# Patient Record
Sex: MALE | Race: WHITE | Employment: FULL TIME | URBAN - NONMETROPOLITAN AREA
[De-identification: names, ages, dates, MRNs, and addresses within clinical notes are randomized per-mention and may not be internally consistent; named-entity substitution may affect disease eponyms.]

---
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
## 2020-10-19 ENCOUNTER — HOSPITAL ENCOUNTER (EMERGENCY)
Age: 20
Discharge: HOME | End: 2020-10-19

## 2020-10-19 VITALS
RESPIRATION RATE: 16 BRPM | SYSTOLIC BLOOD PRESSURE: 137 MMHG | TEMPERATURE: 98.3 F | BODY MASS INDEX: 26.6 KG/M2 | DIASTOLIC BLOOD PRESSURE: 64 MMHG | WEIGHT: 190 LBS | HEART RATE: 81 BPM | HEIGHT: 71 IN | OXYGEN SATURATION: 98 %

## 2020-10-19 PROCEDURE — 99213 OFFICE O/P EST LOW 20 MIN: CPT

## 2020-10-19 PROCEDURE — 99203 OFFICE O/P NEW LOW 30 MIN: CPT | Performed by: NURSE PRACTITIONER

## 2020-10-19 RX ORDER — ACETAMINOPHEN 325 MG/1
650 TABLET ORAL EVERY 6 HOURS PRN
COMMUNITY

## 2020-10-19 RX ORDER — DICYCLOMINE HCL 20 MG
20 TABLET ORAL EVERY 6 HOURS PRN
Qty: 56 TABLET | Refills: 0 | Status: SHIPPED | OUTPATIENT
Start: 2020-10-19 | End: 2021-04-24

## 2020-10-19 ASSESSMENT — ENCOUNTER SYMPTOMS
VOMITING: 0
ABDOMINAL PAIN: 1
BLOOD IN STOOL: 0
NAUSEA: 0
DIARRHEA: 1

## 2020-10-19 ASSESSMENT — PAIN SCALES - GENERAL: PAINLEVEL_OUTOF10: 3

## 2020-10-19 ASSESSMENT — PAIN DESCRIPTION - PROGRESSION: CLINICAL_PROGRESSION: GRADUALLY WORSENING

## 2020-10-19 ASSESSMENT — PAIN DESCRIPTION - ORIENTATION: ORIENTATION: LEFT;LOWER

## 2020-10-19 ASSESSMENT — PAIN DESCRIPTION - LOCATION: LOCATION: ABDOMEN

## 2020-10-19 ASSESSMENT — PAIN DESCRIPTION - ONSET: ONSET: ON-GOING

## 2020-10-19 ASSESSMENT — PAIN DESCRIPTION - PAIN TYPE: TYPE: ACUTE PAIN

## 2020-10-19 ASSESSMENT — PAIN DESCRIPTION - DESCRIPTORS: DESCRIPTORS: CRAMPING

## 2020-10-19 ASSESSMENT — PAIN DESCRIPTION - FREQUENCY: FREQUENCY: INTERMITTENT

## 2020-10-19 NOTE — ED PROVIDER NOTES
Patient  reports that he is a non-smoker but has been exposed to tobacco smoke. He has never used smokeless tobacco. He reports current drug use. Drug: Marijuana. He reports that he does not drink alcohol. PHYSICAL EXAM     ED TRIAGE VITALS  BP: 137/64, Temp: 98.3 °F (36.8 °C), Pulse: 81, Resp: 16, SpO2: 98 %,Estimated body mass index is 26.5 kg/m² as calculated from the following:    Height as of this encounter: 5' 11\" (1.803 m). Weight as of this encounter: 190 lb (86.2 kg). ,No LMP for male patient. Physical Exam  Constitutional:       General: He is not in acute distress. Appearance: He is well-developed. He is not ill-appearing, toxic-appearing or diaphoretic. Abdominal:      Tenderness: There is abdominal tenderness (left lower) in the left lower quadrant. Skin:     General: Skin is warm. Neurological:      General: No focal deficit present. Mental Status: He is alert and oriented to person, place, and time. Cranial Nerves: No cranial nerve deficit. Motor: No weakness. Psychiatric:         Mood and Affect: Mood normal. Mood is not anxious or depressed. Behavior: Behavior normal.         DIAGNOSTIC RESULTS     Labs:No results found for this visit on 10/19/20. IMAGING:    No orders to display     URGENT CARE COURSE:     Vitals:    10/19/20 1558   BP: 137/64   Pulse: 81   Resp: 16   Temp: 98.3 °F (36.8 °C)   SpO2: 98%   Weight: 190 lb (86.2 kg)   Height: 5' 11\" (1.803 m)       Medications - No data to display         PROCEDURES:  None    FINAL IMPRESSION      1. Diarrhea, unspecified type    2. Gastroenteritis          DISPOSITION/ PLAN   Patient is discharged home with prescription for Bentyl for management of ongoing diarrhea.   Commend clear liquid diet today, as tolerated tomorrow patient declines need for GI panel with PCR with loose stool, he is open to following up with GI specialist given his symptoms seem to be ongoing, has had similar episodes in the past.

## 2020-10-19 NOTE — ED TRIAGE NOTES
To room with c/o diarrhea for 2 weeks. LLQ abdominal pain that has progressed from 3/10 at 5/10. No vomiting. Bloating after eating.

## 2020-10-19 NOTE — ED NOTES
Pt. Released in stable condition, ambulated per self to private car. Instructed pt to follow-up with family doctor as needed for recheck or go directly to the emergency department for any concerns/worsening conditions. Pt. Verbalized understanding of instructions. No questions at this time. RX in hand.       Fernando Lew RN  10/19/20 9687

## 2021-04-24 ENCOUNTER — HOSPITAL ENCOUNTER (EMERGENCY)
Age: 21
Discharge: HOME | End: 2021-04-24

## 2021-04-24 VITALS
DIASTOLIC BLOOD PRESSURE: 88 MMHG | SYSTOLIC BLOOD PRESSURE: 158 MMHG | RESPIRATION RATE: 18 BRPM | TEMPERATURE: 98.2 F | WEIGHT: 192 LBS | BODY MASS INDEX: 26.88 KG/M2 | HEIGHT: 71 IN | HEART RATE: 94 BPM | OXYGEN SATURATION: 98 %

## 2021-04-24 DIAGNOSIS — J01.20 ACUTE NON-RECURRENT ETHMOIDAL SINUSITIS: Primary | ICD-10-CM

## 2021-04-24 PROCEDURE — 99213 OFFICE O/P EST LOW 20 MIN: CPT | Performed by: NURSE PRACTITIONER

## 2021-04-24 PROCEDURE — 99213 OFFICE O/P EST LOW 20 MIN: CPT

## 2021-04-24 RX ORDER — AZITHROMYCIN 250 MG/1
TABLET, FILM COATED ORAL
Qty: 6 TABLET | Refills: 0 | Status: SHIPPED | OUTPATIENT
Start: 2021-04-24

## 2021-04-24 RX ORDER — PREDNISONE 20 MG/1
20 TABLET ORAL DAILY
Qty: 7 TABLET | Refills: 0 | Status: SHIPPED | OUTPATIENT
Start: 2021-04-24 | End: 2021-05-01

## 2021-04-24 RX ORDER — DIPHENHYDRAMINE HCL 25 MG
25 TABLET ORAL EVERY 6 HOURS PRN
COMMUNITY

## 2021-04-24 ASSESSMENT — ENCOUNTER SYMPTOMS
RHINORRHEA: 1
DIARRHEA: 0
SINUS PRESSURE: 1
SINUS PAIN: 0
NAUSEA: 0
VOMITING: 0
SHORTNESS OF BREATH: 0
TROUBLE SWALLOWING: 0
SORE THROAT: 0
COUGH: 0
BACK PAIN: 0

## 2021-04-24 NOTE — ED PROVIDER NOTES
[address]  Urgent Care Encounter       CHIEF COMPLAINT       Chief Complaint   Patient presents with    Sinusitis       Nurses Notes reviewed and I agree except as noted in the HPI. HISTORY OF PRESENT ILLNESS   Musa Sanders is a 23 y.o. male who presents the urgent care center complaining of sinus pressure thick green nasal drainage postnasally. Over the past week. Patient states she is has not taken really any over-the-counter medication for this. He denies any vertigo or tinnitus. Patient denies any ear pain sore throat cough patient states that he has had intermittent bouts of nausea but thinks is from postnasal drainage. Present time patient is sitting in the chair skin is warm dry patient does not appear to be in any acute distress. The history is provided by the patient. No  was used. Sinusitis  Pain details:     Location:  Frontal and maxillary    Severity:  No pain    Duration:  1 week    Timing:  Constant  Duration:  1 week  Progression:  Worsening  Chronicity:  New  Context: not allergies    Relieved by:  None tried  Worsened by:  Nothing  Ineffective treatments:  None tried  Associated symptoms: congestion and rhinorrhea    Associated symptoms: no chest pain, no chills, no cough, no ear pain, no fatigue, no fever, no headaches, no nausea, no shortness of breath, no sore throat and no vomiting    Congestion:     Interferes with sleep: no      Interferes with eating/drinking: no    Rhinorrhea:     Quality:  Green    Severity:  Mild    Duration:  1 week    Timing:  Constant    Progression:  Unchanged      REVIEW OF SYSTEMS     Review of Systems   Constitutional: Negative for activity change, appetite change, chills, fatigue and fever. HENT: Positive for congestion, rhinorrhea and sinus pressure. Negative for ear pain, sinus pain, sore throat and trouble swallowing. Respiratory: Negative for cough and shortness of breath.     Cardiovascular: encounter: 192 lb (87.1 kg). ,No LMP for male patient. Physical Exam  Vitals signs and nursing note reviewed. Constitutional:       General: He is not in acute distress. Appearance: He is well-developed. He is not ill-appearing, toxic-appearing or diaphoretic. HENT:      Head: Normocephalic. Right Ear: Hearing, tympanic membrane, ear canal and external ear normal. Tympanic membrane is not erythematous. Left Ear: Hearing, tympanic membrane, ear canal and external ear normal. Tympanic membrane is not erythematous. Nose: Mucosal edema and congestion present. No rhinorrhea. Right Turbinates: Not enlarged or swollen. Left Turbinates: Not enlarged or swollen. Comments: Ethmoid sinus pain/pressure     Mouth/Throat:      Lips: Pink. Mouth: Mucous membranes are moist.      Tongue: No lesions. Pharynx: Oropharynx is clear. Uvula midline. No pharyngeal swelling, oropharyngeal exudate, posterior oropharyngeal erythema or uvula swelling. Tonsils: No tonsillar exudate or tonsillar abscesses. Eyes:      Conjunctiva/sclera: Conjunctivae normal.      Pupils: Pupils are equal, round, and reactive to light. Neck:      Musculoskeletal: Full passive range of motion without pain, normal range of motion and neck supple. No neck rigidity. Cardiovascular:      Rate and Rhythm: Normal rate and regular rhythm. Heart sounds: Normal heart sounds, S1 normal and S2 normal.   Pulmonary:      Effort: Pulmonary effort is normal. No accessory muscle usage. Breath sounds: Normal breath sounds. No decreased air movement. No decreased breath sounds, wheezing, rhonchi or rales. Lymphadenopathy:      Head:      Right side of head: No submental, submandibular, tonsillar, preauricular, posterior auricular or occipital adenopathy. Left side of head: No submental, submandibular, tonsillar, preauricular, posterior auricular or occipital adenopathy.       Cervical: No cervical adenopathy. Right cervical: No superficial, deep or posterior cervical adenopathy. Left cervical: No superficial, deep or posterior cervical adenopathy. Skin:     General: Skin is warm and dry. Capillary Refill: Capillary refill takes less than 2 seconds. Neurological:      General: No focal deficit present. Mental Status: He is alert and oriented to person, place, and time. Psychiatric:         Mood and Affect: Mood normal.         Speech: Speech normal.         Behavior: Behavior normal. Behavior is cooperative. DIAGNOSTIC RESULTS     Labs:No results found for this visit on 04/24/21. IMAGING:    No orders to display         EKG:      URGENT CARE COURSE:     Vitals:    04/24/21 1641   BP: (!) 158/88   Pulse: 94   Resp: 18   Temp: 98.2 °F (36.8 °C)   TempSrc: Temporal   SpO2: 98%   Weight: 192 lb (87.1 kg)   Height: 5' 11\" (1.803 m)       Medications - No data to display         PROCEDURES:  None    FINAL IMPRESSION      1. Acute non-recurrent ethmoidal sinusitis          DISPOSITION/ PLAN        The patient/Patient representative was advised to rest, drink lots of fluids, take Motrin and Tylenol for any fever, chills generalized body aches. The patient was also advised to monitor urine output for signs of dehydration. If the patient develops any chest pain, shortness of breath, neck pain or stiffness or abdominal pain or any other concerns, the patient is to call 911 or go to the emergency department for further evaluation. If the patient does not experience any of the above symptoms he is to follow-up with his primary care provider in 2-3 days for reevaluation. The patient/Patient representative are agreeable to the treatment plan at this time. The patient left the urgent care center in stable condition. PATIENT REFERRED TO:  Buzz Mercedes, APRN - CNP  [address] [facility] [address]      DISCHARGE MEDICATIONS:  Discharge Medication List as of 4/24/2021  5:15 PM      START taking these medications    Details   azithromycin (ZITHROMAX Z-EVIN) 250 MG tablet 2 tablets day 1 then1 tablet days 2 - 5., Disp-6 tablet, R-0Normal      predniSONE (DELTASONE) 20 MG tablet Take 1 tablet by mouth daily for 7 days, Disp-7 tablet, R-0Normal             Discharge Medication List as of 4/24/2021  5:15 PM      STOP taking these medications       dicyclomine (BENTYL) 20 MG tablet Comments:   Reason for Stopping:               Discharge Medication List as of 4/24/2021  5:15 PM          TJ Najera CNP    (Please note that portions of this note were completed with a voice recognition program. Efforts were made to edit the dictations but occasionally words are mis-transcribed.)           TJ Najera CNP  04/24/21 1014

## 2021-04-24 NOTE — ED NOTES
PT GIVEN DISCHARGE INSTRUCTIONS, VERBALIZES UNDERSTANDING. PT ASSESSMENT UNCHANGED, DISCHARGED IN STABLE CONDITION.         Allison Lakhnai RN  04/24/21 5429

## 2023-01-01 ENCOUNTER — HOSPITAL ENCOUNTER (EMERGENCY)
Age: 23
Discharge: HOME | End: 2023-01-01
Attending: EMERGENCY MEDICINE
Payer: COMMERCIAL

## 2023-01-01 ENCOUNTER — APPOINTMENT (OUTPATIENT)
Dept: GENERAL RADIOLOGY | Age: 23
End: 2023-01-01
Payer: COMMERCIAL

## 2023-01-01 VITALS
WEIGHT: 151 LBS | HEART RATE: 71 BPM | TEMPERATURE: 98.4 F | DIASTOLIC BLOOD PRESSURE: 67 MMHG | HEIGHT: 71 IN | RESPIRATION RATE: 16 BRPM | OXYGEN SATURATION: 99 % | BODY MASS INDEX: 21.14 KG/M2 | SYSTOLIC BLOOD PRESSURE: 119 MMHG

## 2023-01-01 DIAGNOSIS — J10.1 INFLUENZA A: Primary | ICD-10-CM

## 2023-01-01 LAB
FLU A ANTIGEN: POSITIVE
FLU B ANTIGEN: NEGATIVE
GROUP A STREP CULTURE, REFLEX: NEGATIVE
REFLEX THROAT C + S: NORMAL
SARS-COV-2, NAAT: NOT  DETECTED

## 2023-01-01 PROCEDURE — 87880 STREP A ASSAY W/OPTIC: CPT

## 2023-01-01 PROCEDURE — 71046 X-RAY EXAM CHEST 2 VIEWS: CPT

## 2023-01-01 PROCEDURE — 87070 CULTURE OTHR SPECIMN AEROBIC: CPT

## 2023-01-01 PROCEDURE — 87635 SARS-COV-2 COVID-19 AMP PRB: CPT

## 2023-01-01 PROCEDURE — 87804 INFLUENZA ASSAY W/OPTIC: CPT

## 2023-01-01 PROCEDURE — 99284 EMERGENCY DEPT VISIT MOD MDM: CPT | Performed by: EMERGENCY MEDICINE

## 2023-01-01 NOTE — DISCHARGE INSTRUCTIONS
Over the counter medication for pain, fever as needed. Over the counter cough medication as needed. Rest, plenty of fluids to drink. Return to ED for increasing shortness of breath, or any other signs of worsening.

## 2023-01-01 NOTE — ED NOTES
Pt presents ambulatory to ED nasal congestion, fatigue, cough, chills. Symptoms initially started approx. 4 days ago. Throat, flu and covid swabs obtained and taken to lab.       Elpidio Callahan RN  01/01/23 8773

## 2023-01-01 NOTE — Clinical Note
Estela Ortiz was seen and treated in our emergency department on 1/1/2023. He may return to work on 01/02/2023. Test positive for influenza A today. If you have any questions or concerns, please don't hesitate to call.       Batsheva Arguello MD

## 2023-01-02 ASSESSMENT — ENCOUNTER SYMPTOMS
COUGH: 1
SORE THROAT: 1
DIARRHEA: 0
WHEEZING: 1
ABDOMINAL PAIN: 0
NAUSEA: 0
SHORTNESS OF BREATH: 0

## 2023-01-02 NOTE — ED NOTES
AVS and work note rev'd with pt. And copy given. Pulse regular. Extremities warm. Respirations regular and quiet. Mucous membranes pink & moist. Alert and oriented times 3. No nausea or vomiting. Range of motion within patient's limits. Skin pink, warm and dry. Calm and cooperative.       Brynn Heck RN  01/01/23 4461

## 2023-01-02 NOTE — ED PROVIDER NOTES
[facility]  eMERGENCY dEPARTMENT eNCOUnter             [address] COMPLAINT    Chief Complaint   Patient presents with    Cough    Fatigue    Pharyngitis    Chills       Nurses Notes reviewed and I agree except as noted in the HPI. HPI    Tierney Jang is a 20 y.o. male who presents with a 4-day history of nasal congestion, fatigue, cough, chills, fever, extreme weakness, generalized malaise. He has been going to work, because he cannot afford not to. While he was at work today, he had to leave early because of his chills and extreme weakness. He works at [employer]. Current pain level is 4/10, aching, constant. REVIEW OF SYSTEMS      Review of Systems   Constitutional:  Positive for chills, fever and malaise/fatigue. HENT:  Positive for congestion and sore throat. Respiratory:  Positive for cough and wheezing. Negative for shortness of breath. Cardiovascular:  Negative for chest pain. Gastrointestinal:  Negative for abdominal pain, diarrhea and nausea. Musculoskeletal:  Positive for myalgias. Skin:  Negative for rash. Neurological:  Positive for dizziness, weakness and headaches. Negative for focal weakness. All other systems reviewed and are negative. PAST MEDICAL HISTORY     has a past medical history of ADHD (attention deficit hyperactivity disorder), Platelet dysfunction ([address] and Seizures ([facility] [address] SURGICAL HISTORY     has a past surgical history that includes Appendectomy. CURRENT MEDICATIONS    Discharge Medication List as of 1/1/2023  6:58 PM        CONTINUE these medications which have NOT CHANGED    Details   acetaminophen (TYLENOL) 325 MG tablet Take 650 mg by mouth every 6 hours as needed for PainHistorical Med             ALLERGIES    is allergic to amoxicillin and ibuprofen. FAMILY HISTORY    He indicated that his mother is alive. He indicated that his father is alive.    family history includes Anxiety Disorder in his mother; High Blood Pressure in his mother; No Known Problems in his father; Thyroid Disease in his mother. SOCIAL HISTORY     reports that he has quit smoking. He has never used smokeless tobacco. He reports current drug use. Drug: Marijuana Christine Eglin). He reports that he does not drink alcohol. PHYSICAL EXAM       INITIAL VITALS: /67   Pulse 71   Temp 98.4 °F (36.9 °C) (Temporal)   Resp 16   Ht 5' 11\" (1.803 m)   Wt 151 lb (68.5 kg)   SpO2 99%   BMI 21.06 kg/m²      Physical Exam  Vitals and nursing note reviewed. Constitutional:       Appearance: He is ill-appearing. HENT:      Right Ear: Tympanic membrane and ear canal normal.      Left Ear: Tympanic membrane and ear canal normal.      Nose: Congestion present. No rhinorrhea. Mouth/Throat:      Mouth: Mucous membranes are moist.      Pharynx: Posterior oropharyngeal erythema present. No oropharyngeal exudate. Eyes:      Conjunctiva/sclera: Conjunctivae normal.      Pupils: Pupils are equal, round, and reactive to light. Cardiovascular:      Rate and Rhythm: Normal rate and regular rhythm. Heart sounds: No murmur heard. Pulmonary:      Effort: Pulmonary effort is normal.      Breath sounds: Wheezing (End expiratory, right greater than left) present. Abdominal:      General: Bowel sounds are normal.      Palpations: Abdomen is soft. There is no mass. Tenderness: There is no abdominal tenderness. Musculoskeletal:      Cervical back: Neck supple. Lymphadenopathy:      Cervical: No cervical adenopathy. Skin:     General: Skin is warm and dry. Neurological:      General: No focal deficit present. Mental Status: He is alert and oriented to person, place, and time. Psychiatric:         Behavior: Behavior normal.      Differential diagnosis: COVID, flu, strep, other viral URI, sinusitis, bronchitis, pneumonia    RADIOLOGY:    XR CHEST (2 VW)   Final Result   Normal chest. No acute findings. **This report has been created using voice recognition software. It may contain minor errors which are inherent in voice recognition technology. **      Final report electronically signed by Dr. Mariam Fernando on 1/1/2023 6:39 PM              LABS:     Labs Reviewed   RAPID INFLUENZA A/B ANTIGENS - Abnormal; Notable for the following components:       Result Value    Flu A Antigen Positive (*)     All other components within normal limits   COVID-19, RAPID   CULTURE, THROAT    Narrative:     Source: throat       Site: swab          Current Antibiotics: none   GROUP A STREP, REFLEX       Vitals:    Vitals:    01/01/23 1738   BP: 119/67   Pulse: 71   Resp: 16   Temp: 98.4 °F (36.9 °C)   TempSrc: Temporal   SpO2: 99%   Weight: 151 lb (68.5 kg)   Height: 5' 11\" (1.803 m)       EMERGENCY DEPARTMENT COURSE:    Patient has influenza A. He does not have COVID-19. He does not have strep. No signs of pneumonia. All of this was discussed with the patient. He has been ill too long for Tamiflu to be effective. General measures for influenza discussed. He is given a note for time missed at work today. He says \"they do not care if I have a note \". He is planning to try to go to work tomorrow. No current evidence of bacterial infection. Symptomatic care only. MIPS for viral respiratory infection: No antibiotic given. FINAL IMPRESSION      1. Influenza A        DISPOSITION/PLAN    DISPOSITION Decision To Discharge 01/01/2023 06:55:10 PM      PATIENT REFERRED TO:    [facility]  [address] [address] [id number]  [phone]      As needed      DISCHARGE MEDICATIONS:    Discharge Medication List as of 1/1/2023  6:58 PM             (Please note that portions of this note were completed with a voice recognition program.  Efforts were made to edit the dictations but occasionally words are mis-transcribed.)       Giacomo Prater MD  01/02/23 0383

## 2023-01-04 LAB — THROAT/NOSE CULTURE: NORMAL

## 2023-05-31 ENCOUNTER — OFFICE VISIT (OUTPATIENT)
Dept: FAMILY MEDICINE CLINIC | Age: 23
End: 2023-05-31
Payer: COMMERCIAL

## 2023-05-31 VITALS
OXYGEN SATURATION: 99 % | HEART RATE: 79 BPM | TEMPERATURE: 97.6 F | WEIGHT: 156.2 LBS | DIASTOLIC BLOOD PRESSURE: 60 MMHG | BODY MASS INDEX: 21.87 KG/M2 | HEIGHT: 71 IN | SYSTOLIC BLOOD PRESSURE: 110 MMHG | RESPIRATION RATE: 14 BRPM

## 2023-05-31 DIAGNOSIS — F90.0 ATTENTION DEFICIT HYPERACTIVITY DISORDER (ADHD), PREDOMINANTLY INATTENTIVE TYPE: Primary | ICD-10-CM

## 2023-05-31 DIAGNOSIS — Z86.69 HX OF SEIZURE DISORDER: ICD-10-CM

## 2023-05-31 PROCEDURE — 99204 OFFICE O/P NEW MOD 45 MIN: CPT | Performed by: NURSE PRACTITIONER

## 2023-05-31 RX ORDER — ATOMOXETINE 40 MG/1
40 CAPSULE ORAL DAILY
Qty: 30 CAPSULE | Refills: 1 | Status: SHIPPED | OUTPATIENT
Start: 2023-05-31

## 2023-05-31 SDOH — ECONOMIC STABILITY: INCOME INSECURITY: HOW HARD IS IT FOR YOU TO PAY FOR THE VERY BASICS LIKE FOOD, HOUSING, MEDICAL CARE, AND HEATING?: NOT HARD AT ALL

## 2023-05-31 SDOH — ECONOMIC STABILITY: FOOD INSECURITY: WITHIN THE PAST 12 MONTHS, THE FOOD YOU BOUGHT JUST DIDN'T LAST AND YOU DIDN'T HAVE MONEY TO GET MORE.: NEVER TRUE

## 2023-05-31 SDOH — ECONOMIC STABILITY: FOOD INSECURITY: WITHIN THE PAST 12 MONTHS, YOU WORRIED THAT YOUR FOOD WOULD RUN OUT BEFORE YOU GOT MONEY TO BUY MORE.: NEVER TRUE

## 2023-05-31 SDOH — ECONOMIC STABILITY: HOUSING INSECURITY
IN THE LAST 12 MONTHS, WAS THERE A TIME WHEN YOU DID NOT HAVE A STEADY PLACE TO SLEEP OR SLEPT IN A SHELTER (INCLUDING NOW)?: NO

## 2023-05-31 ASSESSMENT — PATIENT HEALTH QUESTIONNAIRE - PHQ9
SUM OF ALL RESPONSES TO PHQ QUESTIONS 1-9: 2
SUM OF ALL RESPONSES TO PHQ QUESTIONS 1-9: 2
2. FEELING DOWN, DEPRESSED OR HOPELESS: 1
SUM OF ALL RESPONSES TO PHQ QUESTIONS 1-9: 2
SUM OF ALL RESPONSES TO PHQ QUESTIONS 1-9: 2
1. LITTLE INTEREST OR PLEASURE IN DOING THINGS: 1
SUM OF ALL RESPONSES TO PHQ9 QUESTIONS 1 & 2: 2

## 2023-05-31 NOTE — PROGRESS NOTES
Ron Hunt (:  2000) is a 20 y.o. male,Established patient, here for evaluation of the following chief complaint(s):  New Patient (/Discuss ADHD- good and bad days/Focus issues/ cant do tasks very well, does cause some anxiety and depression )         ASSESSMENT/PLAN:  1. Attention deficit hyperactivity disorder (ADHD), predominantly inattentive type  2. Hx of seizure disorder    At this time we will avoid stimulants and Wellbutrin as it may aggravate his seizure disorder. We will go ahead and start Strattera to see if it helps him with his ADHD. He is to monitor for any seizure activity. We will consider getting him in with psychiatry and counseling if no better. Return in about 4 weeks (around 2023). Subjective   SUBJECTIVE/OBJECTIVE:  HPI    Hx of Platelet function disorder  Seizures when child  Adhd disorder. 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 Problems focusing. Cant pay attention. Skipping around on task. Effecting both work and home life. Was on meds when he was young but was stopped due to seizures. Review of Systems   Constitutional:  Negative for activity change, appetite change, chills, diaphoresis, fatigue, fever and unexpected weight change. HENT:  Negative for congestion, ear pain, postnasal drip, sinus pressure and sore throat. Eyes:  Negative for visual disturbance. Respiratory:  Negative for cough, chest tightness, shortness of breath, wheezing and stridor. Cardiovascular:  Negative for chest pain, palpitations and leg swelling. Gastrointestinal:  Negative for abdominal distention, abdominal pain, constipation, diarrhea, nausea and vomiting. Endocrine: Negative for polydipsia, polyphagia and polyuria. Genitourinary:  Negative for decreased urine volume, difficulty urinating, dysuria, flank pain, frequency, hematuria and urgency.    Musculoskeletal:  Negative for arthralgias, back pain, gait problem, joint swelling,

## 2023-06-03 ASSESSMENT — ENCOUNTER SYMPTOMS
ABDOMINAL DISTENTION: 0
COLOR CHANGE: 0
NAUSEA: 0
DIARRHEA: 0
CHEST TIGHTNESS: 0
SHORTNESS OF BREATH: 0
SINUS PRESSURE: 0
CONSTIPATION: 0
STRIDOR: 0
SORE THROAT: 0
BACK PAIN: 0
WHEEZING: 0
VOMITING: 0
COUGH: 0
ABDOMINAL PAIN: 0

## 2023-06-28 ENCOUNTER — OFFICE VISIT (OUTPATIENT)
Dept: FAMILY MEDICINE CLINIC | Age: 23
End: 2023-06-28
Payer: COMMERCIAL

## 2023-06-28 VITALS
BODY MASS INDEX: 21.98 KG/M2 | WEIGHT: 157 LBS | DIASTOLIC BLOOD PRESSURE: 84 MMHG | OXYGEN SATURATION: 99 % | HEIGHT: 71 IN | SYSTOLIC BLOOD PRESSURE: 118 MMHG | HEART RATE: 70 BPM | RESPIRATION RATE: 18 BRPM

## 2023-06-28 DIAGNOSIS — Z86.69 HX OF SEIZURE DISORDER: ICD-10-CM

## 2023-06-28 DIAGNOSIS — F90.0 ATTENTION DEFICIT HYPERACTIVITY DISORDER (ADHD), PREDOMINANTLY INATTENTIVE TYPE: Primary | ICD-10-CM

## 2023-06-28 DIAGNOSIS — R61 SWEATING INCREASE: ICD-10-CM

## 2023-06-28 PROCEDURE — 99214 OFFICE O/P EST MOD 30 MIN: CPT | Performed by: NURSE PRACTITIONER

## 2023-06-28 RX ORDER — ATOMOXETINE 60 MG/1
60 CAPSULE ORAL DAILY
Qty: 30 CAPSULE | Refills: 1 | Status: SHIPPED | OUTPATIENT
Start: 2023-06-28

## 2023-06-28 ASSESSMENT — ENCOUNTER SYMPTOMS
SHORTNESS OF BREATH: 0
CHEST TIGHTNESS: 0
COUGH: 0
SORE THROAT: 0
ABDOMINAL PAIN: 0
STRIDOR: 0
ABDOMINAL DISTENTION: 0
SINUS PRESSURE: 0
DIARRHEA: 0
NAUSEA: 0
WHEEZING: 0
COLOR CHANGE: 0
CONSTIPATION: 0
VOMITING: 0
BACK PAIN: 0

## 2024-12-16 ENCOUNTER — HOSPITAL ENCOUNTER (EMERGENCY)
Age: 24
Discharge: HOME | End: 2024-12-16
Attending: EMERGENCY MEDICINE
Payer: COMMERCIAL

## 2024-12-16 VITALS
DIASTOLIC BLOOD PRESSURE: 74 MMHG | BODY MASS INDEX: 23.38 KG/M2 | WEIGHT: 167 LBS | OXYGEN SATURATION: 100 % | RESPIRATION RATE: 20 BRPM | SYSTOLIC BLOOD PRESSURE: 128 MMHG | TEMPERATURE: 97.7 F | HEIGHT: 71 IN | HEART RATE: 69 BPM

## 2024-12-16 DIAGNOSIS — K08.89 PAIN, DENTAL: Primary | ICD-10-CM

## 2024-12-16 PROCEDURE — 99283 EMERGENCY DEPT VISIT LOW MDM: CPT

## 2024-12-16 RX ORDER — CLINDAMYCIN HYDROCHLORIDE 300 MG/1
300 CAPSULE ORAL 3 TIMES DAILY
Qty: 21 CAPSULE | Refills: 0 | Status: SHIPPED | OUTPATIENT
Start: 2024-12-16 | End: 2024-12-23

## 2024-12-16 ASSESSMENT — PAIN SCALES - GENERAL: PAINLEVEL_OUTOF10: 6

## 2024-12-16 ASSESSMENT — PAIN DESCRIPTION - ORIENTATION: ORIENTATION: LEFT;UPPER

## 2024-12-16 ASSESSMENT — PAIN DESCRIPTION - DESCRIPTORS: DESCRIPTORS: THROBBING

## 2024-12-16 ASSESSMENT — PAIN - FUNCTIONAL ASSESSMENT: PAIN_FUNCTIONAL_ASSESSMENT: 0-10

## 2024-12-16 ASSESSMENT — PAIN DESCRIPTION - PAIN TYPE: TYPE: ACUTE PAIN

## 2024-12-16 ASSESSMENT — PAIN DESCRIPTION - LOCATION: LOCATION: TEETH

## 2024-12-16 NOTE — DISCHARGE INSTR - COC
Continuity of Care Form    Patient Name: Ryan C Hosler   :  2000  MRN:  [id number]    Admit date:  2024  Discharge date:  ***    Code Status Order: No Order   Advance Directives:   Advance Care Flowsheet Documentation             Admitting Physician:  No admitting provider for patient encounter.  PCP: Larry Campa, APRN - CNP    Discharging Nurse: ***  Discharging Hospital Unit/Room#: E3/E3  Discharging Unit Phone Number: ***    Emergency Contact:   Extended Emergency Contact Information  Primary Emergency Contact: Hosler,Kelly  Address: [address]           [place] [address] [facility]  Home Phone: [phone]  Relation: Parent  Secondary Emergency Contact: Hosler,Jimmy  Address: [address] [place] [address]            [place] [address] [facility]  Home Phone: [phone]  Relation: Parent    Past Surgical History:  Past Surgical History:   Procedure Laterality Date    APPENDECTOMY         Immunization History:   Immunization History   Administered Date(s) Administered    TDaP, ADACEL (age 10y-64y), BOOSTRIX (age 10y+), IM, 0.5mL 2015       Active Problems:  There is no problem list on file for this patient.      Isolation/Infection:   Isolation            No Isolation          Patient Infection Status       Infection Onset Added Last Indicated Last Indicated By Review Planned Expiration Resolved Resolved By    None active    Resolved    Influenza 23 Rapid influenza A/B antigens   23 Infection                        Nurse Assessment:  Last Vital Signs: /74   Pulse 69   Temp 97.7 °F (36.5 °C) (Temporal)   Resp 20   Ht 1.803 m (5' 11\")   Wt 75.8 kg (167 lb)   SpO2 100%   BMI 23.29 kg/m²     Last documented pain score (0-10 scale): Pain Level: 6  Last Weight:   Wt Readings from Last 1 Encounters:   24 75.8 kg (167 lb)     Mental Status:  {IP PT MENTAL STATUS:}    IV Access:  { DONALD IV

## 2024-12-16 NOTE — DISCHARGE INSTRUCTIONS
Take clindamycin antibiotics as prescribed.  These treat dental infections abscesses and other problems.  Continue Orajel and they prescribed dosage.  Use Tylenol or Motrin for pain.  Call for outpatient dental evaluation

## 2024-12-16 NOTE — ED PROVIDER NOTES
Constitutional:  Alert not toxtic or ill,   HENT:  Normocephalic, Atraumatic, dental caries, fractured almost to the gumline without charla abscess or other findings.  Cervical Spine: Normal range of motion,  No stridor.   Eyes:  No discharge or  Swelling  Respiratory: No respiratory distress  Musculoskeletal:  Intact distal pulses, No edema, No tenderness, No tenderness to palpation or major deformities noted.   Integument:  No rash (on exposed areas)   Neurologic:  alert & appropriate       DIAGNOSTIC RESULTS       EKG:      RADIOLOGY:         Interpretation per the Radiologist below, if available at the time of this note:    No orders to display         LABS:  Labs Reviewed - No data to display    All other labs were within normal range or not returned as of this dictation.      MIPS    Not applicable      EMERGENCY DEPARTMENT COURSE and DIFFERENTIAL DIAGNOSIS/MDM:   Patient is discharged home in good condition counseled regards to care treatment evaluation.  Treated with antibiotics and recommend oral over-the-counter medications topical medication.  He needs outpatient follow-up    1)  Number and Complexity of Problems  Problem List This Visit: Dental pain  Problem List Items Addressed This Visit    None  Visit Diagnoses       Pain, dental    -  Primary            Differential Diagnosis: Dental infection, dental abscess, dental caries      2)  Data Reviewed    Imaging that is independently reviewed with the associated radiologist report, not interpreted by me are:  Not applicable    Imaging that is independently reviewed and interpreted by me as:  Not applicable        See more data below for the lab and radiology tests and orders.    3)  Treatment and Disposition    Shared Decision Making:  Not applicable    Decision Rules/Scores utilized:  Not applicable       FINAL  REASSESSMENT     6:04 PM     Discharge home care follow-up with dentist      PROCEDURES:   none    Procedures     FINAL IMPRESSION      1.

## 2024-12-16 NOTE — ED NOTES
Pt arrived to ED 3, c/o dental pain that started 2 months ago.  Pt states that he has been holding off for the last two months with getting into the dentist and today his tooth started throbbing rating pain 9/10 and it is now down to a 6/10.  Pt states he has a cavity and still has his wisdom teeth and feels that it's pressing on his cavity.  Pt's gait steady as he ambulated to room, respirations easy and unlabored, skin is pink, warm, and dry.